# Patient Record
Sex: FEMALE | Race: WHITE | Employment: OTHER | URBAN - METROPOLITAN AREA
[De-identification: names, ages, dates, MRNs, and addresses within clinical notes are randomized per-mention and may not be internally consistent; named-entity substitution may affect disease eponyms.]

---

## 2017-07-20 ENCOUNTER — HOSPITAL ENCOUNTER (EMERGENCY)
Facility: HOSPITAL | Age: 57
Discharge: HOME | End: 2017-07-20
Attending: EMERGENCY MEDICINE
Payer: COMMERCIAL

## 2017-07-20 VITALS
RESPIRATION RATE: 16 BRPM | DIASTOLIC BLOOD PRESSURE: 57 MMHG | WEIGHT: 145 LBS | SYSTOLIC BLOOD PRESSURE: 130 MMHG | HEART RATE: 78 BPM | BODY MASS INDEX: 27.38 KG/M2 | OXYGEN SATURATION: 96 % | HEIGHT: 61 IN | TEMPERATURE: 98 F

## 2017-07-20 DIAGNOSIS — K62.5 RECTAL BLEEDING: Primary | ICD-10-CM

## 2017-07-20 LAB
ANION GAP SERPL CALC-SCNC: 9 MMOL/L (ref 0–18)
BASOPHILS # BLD: 0.1 K/UL (ref 0–0.2)
BASOPHILS NFR BLD: 1 %
BUN SERPL-MCNC: 14 MG/DL (ref 8–20)
BUN/CREAT SERPL: 18.9 (ref 10–20)
CALCIUM SERPL-MCNC: 9.8 MG/DL (ref 8.5–10.5)
CHLORIDE SERPL-SCNC: 103 MMOL/L (ref 95–110)
CO2 SERPL-SCNC: 26 MMOL/L (ref 22–32)
CREAT SERPL-MCNC: 0.74 MG/DL (ref 0.5–1.5)
EOSINOPHIL # BLD: 0.2 K/UL (ref 0–0.7)
EOSINOPHIL NFR BLD: 3 %
ERYTHROCYTE [DISTWIDTH] IN BLOOD BY AUTOMATED COUNT: 13.7 % (ref 11–15)
GLUCOSE SERPL-MCNC: 103 MG/DL (ref 70–99)
HCT VFR BLD AUTO: 38 % (ref 35–48)
HGB BLD-MCNC: 12.8 G/DL (ref 12–16)
LYMPHOCYTES # BLD: 1.8 K/UL (ref 1–4)
LYMPHOCYTES NFR BLD: 21 %
MCH RBC QN AUTO: 31.1 PG (ref 27–32)
MCHC RBC AUTO-ENTMCNC: 33.7 G/DL (ref 32–37)
MCV RBC AUTO: 92.1 FL (ref 80–100)
MONOCYTES # BLD: 0.7 K/UL (ref 0–1)
MONOCYTES NFR BLD: 8 %
NEUTROPHILS # BLD AUTO: 6 K/UL (ref 1.8–7.7)
NEUTROPHILS NFR BLD: 68 %
OSMOLALITY UR CALC.SUM OF ELEC: 287 MOSM/KG (ref 275–295)
PLATELET # BLD AUTO: 268 K/UL (ref 140–400)
PMV BLD AUTO: 7.8 FL (ref 7.4–10.3)
POTASSIUM SERPL-SCNC: 4 MMOL/L (ref 3.3–5.1)
RBC # BLD AUTO: 4.13 M/UL (ref 3.7–5.4)
SODIUM SERPL-SCNC: 138 MMOL/L (ref 136–144)
WBC # BLD AUTO: 8.8 K/UL (ref 4–11)

## 2017-07-20 PROCEDURE — 99283 EMERGENCY DEPT VISIT LOW MDM: CPT

## 2017-07-20 PROCEDURE — 85025 COMPLETE CBC W/AUTO DIFF WBC: CPT

## 2017-07-20 PROCEDURE — 36415 COLL VENOUS BLD VENIPUNCTURE: CPT

## 2017-07-20 PROCEDURE — 80048 BASIC METABOLIC PNL TOTAL CA: CPT | Performed by: EMERGENCY MEDICINE

## 2017-07-20 PROCEDURE — 80048 BASIC METABOLIC PNL TOTAL CA: CPT

## 2017-07-20 PROCEDURE — 85025 COMPLETE CBC W/AUTO DIFF WBC: CPT | Performed by: EMERGENCY MEDICINE

## 2017-07-20 NOTE — ED PROVIDER NOTES
Patient Seen in: [facility] AND [facility] Emergency Department     History   Patient presents with:  GI Bleeding (gastrointestinal)    Stated Complaint:     HPI    59year old female presents to the er to make sure she is not \"getting anemic. \" pt has had chroni normal. Mucous membranes are not pale and not cyanotic. Eyes: Conjunctivae and lids are normal. Right conjunctiva is not injected. Left conjunctiva is not injected. No scleral icterus.  Pupils are equal.   Neck: Normal range of motion and phonation normal hemodynamically normal     07/20/17  1531 07/20/17  1604 07/20/17  1700   BP: (!) 176/65 132/65 130/57   Pulse: 91 86 78   Resp: 20 20 16   Temp: 98.4 °F (36.9 °C) 98.4 °F (36.9 °C)    TempSrc: Temporal Oral    SpO2: 98% 97% 96%   Weight: 65.8 kg     Idalmis Clinical Impression:  Rectal bleeding  (primary encounter diagnosis)    Disposition:  Discharge    Follow-up:  Luh Fall MD  [place] [id number] [phone]            Medications Prescribed:  Current Discharge Medication List

## 2017-07-20 NOTE — ED NOTES
Pt has no complaints at time of discharge. She is awake and alert, ambulatory with steady gait to exit. Instructed to follow-up with MEET Teixeira and SOPHIA Salcedo or to return to ER for any new or worsening symptoms.

## 2017-07-20 NOTE — ED NOTES
Presents to ER for c/o bloody bowel movements intermittent for several months but more frequently over the past 3-4 days. Pt was examined by GI on Friday, rectal exam was performed and MD detected a \"polyp\" as reported by pt.  She is scheduled for a colon

## 2017-07-20 NOTE — ED INITIAL ASSESSMENT (HPI)
Pt reports intermittent rectal bleeding x 4 days only when using the bathroom. Pt reports rectal pain, denies abd pain  Has a scheduled colonscopy in a month.   Pt denies blood thinners

## 2017-08-16 PROCEDURE — 88305 TISSUE EXAM BY PATHOLOGIST: CPT | Performed by: INTERNAL MEDICINE

## 2017-08-23 ENCOUNTER — ANESTHESIA EVENT (OUTPATIENT)
Dept: ENDOSCOPY | Facility: HOSPITAL | Age: 57
End: 2017-08-23
Payer: COMMERCIAL

## 2017-08-23 ENCOUNTER — HOSPITAL ENCOUNTER (OUTPATIENT)
Facility: HOSPITAL | Age: 57
Setting detail: HOSPITAL OUTPATIENT SURGERY
Discharge: HOME | End: 2017-08-23
Attending: INTERNAL MEDICINE | Admitting: INTERNAL MEDICINE
Payer: COMMERCIAL

## 2017-08-23 ENCOUNTER — ANESTHESIA (OUTPATIENT)
Dept: ENDOSCOPY | Facility: HOSPITAL | Age: 57
End: 2017-08-23
Payer: COMMERCIAL

## 2017-08-23 ENCOUNTER — SURGERY (OUTPATIENT)
Age: 57
End: 2017-08-23

## 2017-08-23 VITALS
SYSTOLIC BLOOD PRESSURE: 114 MMHG | DIASTOLIC BLOOD PRESSURE: 66 MMHG | OXYGEN SATURATION: 98 % | HEIGHT: 61 IN | BODY MASS INDEX: 27.38 KG/M2 | WEIGHT: 145 LBS | HEART RATE: 79 BPM | RESPIRATION RATE: 16 BRPM | TEMPERATURE: 98 F

## 2017-08-23 DIAGNOSIS — K62.89 RECTAL MASS: ICD-10-CM

## 2017-08-23 PROCEDURE — 88305 TISSUE EXAM BY PATHOLOGIST: CPT | Performed by: INTERNAL MEDICINE

## 2017-08-23 PROCEDURE — 0DBP8ZX EXCISION OF RECTUM, VIA NATURAL OR ARTIFICIAL OPENING ENDOSCOPIC, DIAGNOSTIC: ICD-10-PCS | Performed by: INTERNAL MEDICINE

## 2017-08-23 PROCEDURE — 36415 COLL VENOUS BLD VENIPUNCTURE: CPT | Performed by: INTERNAL MEDICINE

## 2017-08-23 RX ORDER — ACETAMINOPHEN 325 MG/1
325 TABLET ORAL EVERY 6 HOURS PRN
COMMUNITY
End: 2018-02-23

## 2017-08-23 RX ORDER — IBUPROFEN 200 MG
200 TABLET ORAL EVERY 6 HOURS PRN
Status: ON HOLD | COMMUNITY
End: 2017-08-23

## 2017-08-23 RX ORDER — SODIUM CHLORIDE, SODIUM LACTATE, POTASSIUM CHLORIDE, CALCIUM CHLORIDE 600; 310; 30; 20 MG/100ML; MG/100ML; MG/100ML; MG/100ML
INJECTION, SOLUTION INTRAVENOUS CONTINUOUS
Status: CANCELLED | OUTPATIENT
Start: 2017-08-23

## 2017-08-23 RX ORDER — SODIUM CHLORIDE, SODIUM LACTATE, POTASSIUM CHLORIDE, CALCIUM CHLORIDE 600; 310; 30; 20 MG/100ML; MG/100ML; MG/100ML; MG/100ML
INJECTION, SOLUTION INTRAVENOUS CONTINUOUS PRN
Status: DISCONTINUED | OUTPATIENT
Start: 2017-08-23 | End: 2017-08-23 | Stop reason: SURG

## 2017-08-23 RX ORDER — NALOXONE HYDROCHLORIDE 0.4 MG/ML
80 INJECTION, SOLUTION INTRAMUSCULAR; INTRAVENOUS; SUBCUTANEOUS AS NEEDED
Status: CANCELLED | OUTPATIENT
Start: 2017-08-23 | End: 2017-08-23

## 2017-08-23 RX ORDER — NALOXONE HYDROCHLORIDE 0.4 MG/ML
80 INJECTION, SOLUTION INTRAMUSCULAR; INTRAVENOUS; SUBCUTANEOUS AS NEEDED
Status: CANCELLED | OUTPATIENT
Start: 2017-08-23 | End: 2017-08-24

## 2017-08-23 RX ADMIN — SODIUM CHLORIDE, SODIUM LACTATE, POTASSIUM CHLORIDE, CALCIUM CHLORIDE: 600; 310; 30; 20 INJECTION, SOLUTION INTRAVENOUS at 17:22:00

## 2017-08-23 RX ADMIN — SODIUM CHLORIDE, SODIUM LACTATE, POTASSIUM CHLORIDE, CALCIUM CHLORIDE: 600; 310; 30; 20 INJECTION, SOLUTION INTRAVENOUS at 16:51:00

## 2017-08-23 NOTE — ANESTHESIA POSTPROCEDURE EVALUATION
Patient: Malia Wayne    Procedure Summary     Date:  08/23/17 Room / Location:  [facility] ENDOSCOPY 01 / [facility] ENDOSCOPY    Anesthesia Start:  8270 Anesthesia Stop:  2066    Procedures:       ENDOSCOPIC ULTRASOUND (EUS) (N/A )      FLEXIBLE SIGMOIDOSCOPY (Lef

## 2017-08-23 NOTE — BRIEF OP NOTE
Pre-Operative Diagnosis: RECTAL MASS     Post-Operative Diagnosis: rectal mass t3 n1     Procedure Performed:   Procedure(s):  RECTAL ENDOSCOPIC ULTRASOUND  WITH FINE NEEDLE ASPIRATION AND FLEXIBLE SIGMOIDOSCOPY  flex sig with biopsy    Surgeon(s) and ELMER

## 2017-08-23 NOTE — H&P
[facility] Department of  Gastroenterology  Update Health History :       Georganna Members  female   Leena Agarwal MD     [id number]  3/31/1960 Primary Care Physician  Darwin Singleton MD        HPI :  Rectal mass, biopsies were inconclusive. rectal eus  The risks and benefits of the procedure were discussed in detail with the patient, alternatives and options were all discussed, all questions were answered, patient verbalized understanding and agreed to proceed with procedure as scheduled.

## 2017-08-23 NOTE — ANESTHESIA PREPROCEDURE EVALUATION
Anesthesia PreOp Note    HPI:     Xena Garg is a 59year old female who presents for preoperative consultation requested by: El Bhatia MD    Date of Surgery: 8/23/2017    Procedure(s):  ENDOSCOPIC ULTRASOUND (EUS)  FLEXIBLE SIGMOIDOSCOPY  Ind Results  Component Value Date   WBC 8.8 07/20/2017   RBC 4.13 07/20/2017   HGB 12.8 07/20/2017   HCT 38.0 07/20/2017   MCV 92.1 07/20/2017   MCH 31.1 07/20/2017   MCHC 33.7 07/20/2017   RDW 13.7 07/20/2017    07/20/2017   MPV 7.8 07/20/2017       [place]

## 2017-08-24 NOTE — OPERATIVE REPORT
[facility]    PATIENT'S NAME: Ana Agatha   ATTENDING PHYSICIAN: Rollene Kehr, MD   OPERATING PHYSICIAN: Rollene Kehr, MD   PATIENT ACCOUNT#:   [id number]    LOCATION:  [facility] ROOM 7 [facility] 10  MEDICAL RECORD #:   [id number] the circumference of the rectal wall causing some narrowing of the lumen, but we were able to pass scope across it without any difficulty. The mass was sampled extensively. The scope, at this point, was removed.   Next, the Olympus radial electronic echoe

## 2017-08-27 PROBLEM — C21.0 ANAL CANCER (HCC): Status: ACTIVE | Noted: 2017-08-27

## 2017-09-01 PROBLEM — Z71.9 HEALTH EDUCATION/COUNSELING: Status: ACTIVE | Noted: 2017-09-01

## 2017-09-11 ENCOUNTER — HOSPITAL (OUTPATIENT)
Dept: OTHER | Age: 57
End: 2017-09-11
Attending: COLON & RECTAL SURGERY

## 2017-10-13 PROBLEM — T45.1X5A ANEMIA ASSOCIATED WITH CHEMOTHERAPY: Status: ACTIVE | Noted: 2017-10-13

## 2017-10-13 PROBLEM — K62.89 ANAL OR RECTAL PAIN: Status: ACTIVE | Noted: 2017-10-13

## 2017-10-13 PROBLEM — D64.81 ANEMIA ASSOCIATED WITH CHEMOTHERAPY: Status: ACTIVE | Noted: 2017-10-13

## 2017-10-31 PROBLEM — T45.1X5A PANCYTOPENIA DUE TO ANTINEOPLASTIC CHEMOTHERAPY (HCC): Status: ACTIVE | Noted: 2017-10-31

## 2017-10-31 PROBLEM — D61.810 PANCYTOPENIA DUE TO ANTINEOPLASTIC CHEMOTHERAPY (HCC): Status: ACTIVE | Noted: 2017-10-31

## 2018-02-23 PROBLEM — Z08 ROUTINE CANCER FOLLOW-UP VISIT: Status: ACTIVE | Noted: 2018-02-23

## 2018-03-28 PROCEDURE — 86803 HEPATITIS C AB TEST: CPT | Performed by: FAMILY MEDICINE

## 2018-08-30 PROBLEM — Z85.048 HISTORY OF ANAL CANCER: Status: ACTIVE | Noted: 2018-08-30

## 2018-08-30 PROBLEM — M54.50 LEFT-SIDED LOW BACK PAIN WITHOUT SCIATICA: Status: ACTIVE | Noted: 2018-08-30

## 2019-04-18 PROBLEM — E78.00 HYPERCHOLESTEREMIA: Status: ACTIVE | Noted: 2019-04-18

## 2019-04-18 PROBLEM — K62.89 ANAL OR RECTAL PAIN: Status: RESOLVED | Noted: 2017-10-13 | Resolved: 2019-04-18

## 2019-04-18 PROBLEM — E55.9 VITAMIN D DEFICIENCY: Status: ACTIVE | Noted: 2019-04-18

## 2019-04-18 PROBLEM — E03.9 HYPOTHYROIDISM, UNSPECIFIED TYPE: Status: ACTIVE | Noted: 2019-04-18

## 2019-04-18 PROBLEM — F39 MOOD DISORDER (HCC): Status: ACTIVE | Noted: 2019-04-18

## 2019-04-18 PROBLEM — M54.50 LEFT-SIDED LOW BACK PAIN WITHOUT SCIATICA: Status: RESOLVED | Noted: 2018-08-30 | Resolved: 2019-04-18

## 2019-04-18 PROBLEM — F41.1 GAD (GENERALIZED ANXIETY DISORDER): Status: ACTIVE | Noted: 2019-04-18

## 2019-04-18 PROBLEM — R73.9 HYPERGLYCEMIA: Status: ACTIVE | Noted: 2019-04-18

## 2019-04-18 PROCEDURE — 88175 CYTOPATH C/V AUTO FLUID REDO: CPT | Performed by: INTERNAL MEDICINE

## 2019-04-18 PROCEDURE — 87624 HPV HI-RISK TYP POOLED RSLT: CPT | Performed by: INTERNAL MEDICINE

## 2021-07-18 PROBLEM — T45.1X5A PANCYTOPENIA DUE TO ANTINEOPLASTIC CHEMOTHERAPY (HCC): Status: RESOLVED | Noted: 2017-10-31 | Resolved: 2021-07-18

## 2021-07-18 PROBLEM — D61.810 PANCYTOPENIA DUE TO ANTINEOPLASTIC CHEMOTHERAPY (HCC): Status: RESOLVED | Noted: 2017-10-31 | Resolved: 2021-07-18

## 2021-07-18 PROBLEM — F39 MOOD DISORDER (HCC): Status: RESOLVED | Noted: 2019-04-18 | Resolved: 2021-07-18

## (undated) DEVICE — Device: Brand: DEFENDO AIR/WATER/SUCTION AND BIOPSY VALVE

## (undated) DEVICE — CANNULA NASAL 02/C02 ADULT

## (undated) DEVICE — ENDOSCOPY PACK UPPER: Brand: MEDLINE INDUSTRIES, INC.

## (undated) DEVICE — Device: Brand: BALLOON3

## (undated) DEVICE — FORCEP RADIAL JAW 4

## (undated) DEVICE — ENDOSCOPY PACK - LOWER: Brand: MEDLINE INDUSTRIES, INC.

## (undated) NOTE — ED AVS SNAPSHOT
[facility] Emergency Department  [place] [address]  Phone:  [phone]  Fax:  [phone]          Pablo Hunter   MRN: [id number]    Department:  [facility] Emergency Department   Date of Visit:  7/20/2017 visiting [email or web address]    IF THERE IS ANY CHANGE OR WORSENING OF YOUR CONDITION, CALL YOUR PRIMARY CARE PHYSICIAN AT ONCE OR RETURN IMMEDIATELY TO THE EMERGENCY DEPARTMENT.     If you have been prescribed any medication(s), please fill your prescription